# Patient Record
Sex: MALE | Employment: FULL TIME | URBAN - METROPOLITAN AREA
[De-identification: names, ages, dates, MRNs, and addresses within clinical notes are randomized per-mention and may not be internally consistent; named-entity substitution may affect disease eponyms.]

---

## 2018-09-05 ENCOUNTER — HOSPITAL ENCOUNTER (OUTPATIENT)
Dept: MRI IMAGING | Age: 63
Discharge: HOME | End: 2018-09-05
Attending: ORTHOPAEDIC SURGERY
Payer: COMMERCIAL

## 2018-09-05 DIAGNOSIS — M54.12 BRACHIAL NEURITIS: ICD-10-CM

## 2018-09-05 PROCEDURE — 72141 MRI NECK SPINE W/O DYE: CPT

## 2023-03-28 ENCOUNTER — TELEPHONE (OUTPATIENT)
Dept: ORTHOPEDIC SURGERY | Age: 68
End: 2023-03-28

## 2023-03-28 NOTE — TELEPHONE ENCOUNTER
Workers compensation information    [address]  [phone]  [phone]    Claim#- [id number]  DOI- 3/14/23  Injury: Low back    Bill Claims To:     [address] [place] [address]

## 2023-04-05 ENCOUNTER — OFFICE VISIT (OUTPATIENT)
Dept: ORTHOPEDIC SURGERY | Age: 68
End: 2023-04-05

## 2023-04-05 RX ORDER — CYCLOBENZAPRINE HCL 5 MG
5-10 TABLET ORAL
Qty: 30 TABLET | Refills: 1 | Status: SHIPPED
Start: 2023-04-05

## 2023-04-05 RX ORDER — VALSARTAN AND HYDROCHLOROTHIAZIDE 160; 25 MG/1; MG/1
1 TABLET ORAL DAILY
Start: 2023-03-06

## 2023-04-05 RX ORDER — DOXAZOSIN 4 MG/1
TABLET ORAL
Start: 2023-03-06

## 2023-04-05 RX ORDER — METHYLPREDNISOLONE 4 MG/1
TABLET ORAL
Qty: 1 DOSE PACK | Refills: 0 | Status: SHIPPED
Start: 2023-04-05

## 2023-04-05 RX ORDER — ATORVASTATIN CALCIUM 10 MG/1
10 TABLET, FILM COATED ORAL
Start: 2023-03-06

## 2023-04-05 NOTE — PROGRESS NOTES
Brandy Mackey (: 1955) is a 77 y.o. male, patient, here for evaluation of the following chief complaint(s):  Back Pain (Low Back, Bilateral Legs)       ASSESSMENT/PLAN:  Below is the assessment and plan developed based on review of pertinent history, physical exam, labs, studies, and medications. Patient presents today for evaluation of acute low back pain with lower extremity radicular symptoms as detailed below. Radiologic findings reviewed in detail with the patient. No weakness noted on physical exam.  We will begin with conservative management, to include a steroid pack, low-dose muscle relaxer as needed, continued over-the-counter anti-inflammatories as well as physical therapy. He will follow-up in about 4 to 6 weeks, at which time we may get a lumbar spine MRI if his symptoms or not improving. 1. Lumbar back pain  -     XR SPINE LUMB MIN 4 V; Future  2. Acute bilateral low back pain with bilateral sciatica  -     REFERRAL TO PHYSICAL THERAPY      No follow-ups on file. SUBJECTIVE/OBJECTIVE:  Brandy Mackey (: 1955) is a 77 y.o. male. No flowsheet data found. Patient presents today for evaluation of acute low back pain for the last 3 to 4 weeks. He states he was picking up heavy boxes, and had a sudden onset of low back pain. His job sent him to [facility], where he was evaluated. At that point, he states he was having debilitating pain, and it was very difficult for him to get on and off of the examination table. Today, he complains of bilateral low back pain with pain radiating into the posterior aspect of bilateral lower extremities, right greater than left as well as intermittent pain into the left groin and anterior thigh. Leg symptoms are intermittent. He is currently taking ibuprofen with some relief. The provider at [facility] prescribed diclofenac, but his pharmacist suggested he not take that due to risk of increasing blood pressure.   His symptoms are worse with sitting for prolonged periods of time. His symptoms are improved in the morning. He does report some muscle fatigue in bilateral lower extremities with prolonged walking/standing. He has tried to do some home stretching. He states he may twist the wrong way and feels a pop in his low back which causes an onset of sudden increases in pain. No acute changes in bowel or bladder control. No saddle anesthesia. Imaging:    XR Results (most recent):  Results from Appointment encounter on 04/05/23    XR SPINE LUMB MIN 4 V    Narrative  AP, lateral, flexion and extension views of the lumbar spine reveal mild straightening of the lordotic curve. Moderate degeneration at L5-S1. Retrolisthesis at L4-5 measuring approximately 3 mm, this is relatively stable in flexion and extension. No obvious acute fractures or lytic lesions noted. No Known Allergies    Current Outpatient Medications   Medication Sig    atorvastatin (LIPITOR) 10 mg tablet Take 1 Tablet by mouth nightly. valsartan-hydroCHLOROthiazide (DIOVAN-HCT) 160-25 mg per tablet Take 1 Tablet by mouth daily. doxazosin (CARDURA) 4 mg tablet TAKE 1 TABLET BY MOUTH EVERY DAY FOR 90 DAYS    cyclobenzaprine (FLEXERIL) 5 mg tablet Take 1-2 Tablets by mouth three (3) times daily as needed for Muscle Spasm(s). methylPREDNISolone (MEDROL DOSEPACK) 4 mg tablet Per dose pack instructions     No current facility-administered medications for this visit. History reviewed. No pertinent past medical history. History reviewed. No pertinent surgical history. History reviewed. No pertinent family history. Social History     Tobacco Use    Smoking status: Never    Smokeless tobacco: Never        Review of Systems   Constitutional:  Negative for chills and fever. All other systems reviewed and are negative. Vitals:  Ht 5' 6\" (1.676 m)   Wt 165 lb (74.8 kg)   BMI 26.63 kg/m²    Body mass index is 26.63 kg/m².     Physical Exam  Neurologic  Sensory  Light Touch - Intact - Globally. Overall Assessment of Muscle Strength and Tone reveals  Lower Extremities - Right Iliopsoas - 5/5. Left Iliopsoas - 5/5. Right Tibialis Anterior - 5/5. Left Tibialis Anterior - 5/5. Right Gastroc-Soleus - 5/5. Left Gastroc-Soleus - 5/5. Right EHL - 5/5. Left EHL - 5/5. General Assessment of Reflexes  Right Ankle - Clonus is not present. Left Ankle - Clonus is not present. Reflexes (Dermatomes)  2/2 Normal - Left Achilles (L5-S2), Left Knee (L2-4), Right Achilles (L5-S2) and Right Knee (L2-4). Musculoskeletal  Global Assessment  Examination of related systems reveals - well-developed, well-nourished, in no acute distress, alert and oriented x 3. Gait and Station - normal gait and station and normal posture. Right Lower Extremity - normal strength and tone, normal range of motion without pain and no instability, subluxation or laxity. Left Lower Extremity - normal strength and tone, normal range of motion without pain and no instability, subluxation or laxity. Spine/Ribs/Pelvis  Cervical Spine - Examination of the cervical spine reveals - no tenderness to palpation, no pain, no swelling, edema or erythema, normal cervical spine movements and normal sensation. Thoracic (Dorsal) Spine - Examination of the thoracic spine reveals - no tenderness over thoracic vertebrae, no pain, normal sensation and normal thoracic spine movements. Lumbosacral Spine - Examination of the lumbosacral spine reveals - no known fractures or deformities. Inspection and Palpation - Tenderness - moderate. Assessment of pain reveals the following findings - The pain is characterized as - moderate. Location - pain refers to lower back bilaterally. ROJM - Trunk Extension - 15 degrees. Lumbar Spine Flexion - 35 °. Lumbosacral Spine - Functional Testing - Babinski Test negative, Prone Knee Bending Test negative, Slump Test negative, Straight Leg Raising Test negative.        Leonard was available for immediate consult during this encounter. An electronic signature was used to authenticate this note.   -- MARA Yanes

## 2023-04-05 NOTE — LETTER
4/5/2023 8:53 AM    Mr. Zen Guerra  [address]  [facility] [address]      NOTIFICATION RETURN TO WORK       WORK STATUS AND RESTRICTIONS / WHAT Zen Guerra CAN DO SAFELY    To Whom It May Concern:    Zen Guerra  is currently under the care of Brendan Sigala.    ___ No Restrictions, Can Resume Normal Duty Immediately.      ___Can Return to Work on ___    ___May Return to work Duty ___  Hours per day     _x__Can Return to Work With the Following Restrictions: [facility]     __x_ No Lifting Above 10 Pounds   __x_ No Pushing / Pulling   __x_ No Bending / Twisting   __x_ Must alternate sitting and standing positions          Sincerely,      MARA Ritter

## 2023-04-05 NOTE — PROGRESS NOTES
1. Have you been to the ER, urgent care clinic since your last visit? Hospitalized since your last visit? No    2. Have you seen or consulted any other health care providers outside of the [address] [place] [address] since your last visit? Include any pap smears or colon screening.  No    Chief Complaint   Patient presents with    Back Pain     Low Back, Bilateral Legs

## 2023-06-19 ENCOUNTER — HOSPITAL ENCOUNTER (OUTPATIENT)
Facility: HOSPITAL | Age: 68
Discharge: HOME | End: 2023-06-22
Payer: COMMERCIAL

## 2023-06-19 DIAGNOSIS — M54.42 CHRONIC BILATERAL LOW BACK PAIN WITH BILATERAL SCIATICA: ICD-10-CM

## 2023-06-19 DIAGNOSIS — M54.41 CHRONIC BILATERAL LOW BACK PAIN WITH BILATERAL SCIATICA: ICD-10-CM

## 2023-06-19 DIAGNOSIS — G89.29 CHRONIC BILATERAL LOW BACK PAIN WITH BILATERAL SCIATICA: ICD-10-CM

## 2023-06-19 PROCEDURE — 72148 MRI LUMBAR SPINE W/O DYE: CPT
